# Patient Record
Sex: FEMALE | Employment: UNEMPLOYED | URBAN - METROPOLITAN AREA
[De-identification: names, ages, dates, MRNs, and addresses within clinical notes are randomized per-mention and may not be internally consistent; named-entity substitution may affect disease eponyms.]

---

## 2017-01-27 RX ORDER — TIZANIDINE 4 MG/1
TABLET ORAL
Qty: 270 TABLET | Refills: 0 | Status: SHIPPED | OUTPATIENT
Start: 2017-01-27 | End: 2017-09-01

## 2017-01-27 RX ORDER — ONDANSETRON 4 MG/1
TABLET, ORALLY DISINTEGRATING ORAL
Qty: 30 TABLET | Refills: 0 | Status: SHIPPED | OUTPATIENT
Start: 2017-01-27 | End: 2017-05-25

## 2017-01-27 NOTE — TELEPHONE ENCOUNTER
Medication: Tizanidine & Zofran    Date of last refill: 06/08/16 # 270 & 11/18/16  [place]     Last office visit: 10/18/16  Due back to clinic per last office note:  RTN in 8 months by 06/18/17  Date next office visit sc

## 2017-01-27 NOTE — TELEPHONE ENCOUNTER
Medication: Ketorolac injectible    Date of last refill: 6/7/16  [place]    Last office visit: 10/18/16  Due back to clinic per last office note:  8 months  Date next office visit scheduled:  No future appointments.

## 2017-01-28 RX ORDER — KETOROLAC TROMETHAMINE 30 MG/ML
INJECTION, SOLUTION INTRAMUSCULAR; INTRAVENOUS
Qty: 10 ML | Refills: 3 | Status: SHIPPED | OUTPATIENT
Start: 2017-01-28 | End: 2018-12-20

## 2017-01-30 ENCOUNTER — TELEPHONE (OUTPATIENT)
Dept: NEUROLOGY | Facility: CLINIC | Age: 37
End: 2017-01-30

## 2017-01-30 NOTE — TELEPHONE ENCOUNTER
Received fax from [employer] that ketorolac is again not covered. On 12/12/16, Jarod noted that no PA was required if ordered by [facility] provider. [facility] notes that her insurance information may have changed.      Box:  [id number]  PCN:  [id number]

## 2017-02-09 NOTE — TELEPHONE ENCOUNTER
[employer] called stating ketorolac tromethamine 30 MG/ML Injection Solution  In NOT formulary  And can try a PA, PA started already

## 2017-05-23 RX ORDER — NARATRIPTAN 2.5 MG/1
TABLET ORAL
Qty: 9 TABLET | Refills: 5 | Status: SHIPPED | OUTPATIENT
Start: 2017-05-23 | End: 2018-10-08

## 2017-05-23 NOTE — TELEPHONE ENCOUNTER
Medication: Naratriptan    Date of last refill: 6/7/16  [place]    Last office visit: 10/18/16  Due back to clinic per last office note:  8 months  Date next office visit scheduled:  No future appointments.     Last OV

## 2017-05-25 ENCOUNTER — OFFICE VISIT (OUTPATIENT)
Dept: NEUROLOGY | Facility: CLINIC | Age: 37
End: 2017-05-25

## 2017-05-25 VITALS
HEIGHT: 67 IN | SYSTOLIC BLOOD PRESSURE: 120 MMHG | DIASTOLIC BLOOD PRESSURE: 77 MMHG | BODY MASS INDEX: 33.59 KG/M2 | HEART RATE: 88 BPM | WEIGHT: 214 LBS | RESPIRATION RATE: 16 BRPM

## 2017-05-25 DIAGNOSIS — M34.1 CREST SYNDROME (HCC): ICD-10-CM

## 2017-05-25 DIAGNOSIS — M35.9 CONNECTIVE TISSUE DISEASE (HCC): ICD-10-CM

## 2017-05-25 DIAGNOSIS — G43.009 MIGRAINE WITHOUT AURA AND WITHOUT STATUS MIGRAINOSUS, NOT INTRACTABLE: Primary | ICD-10-CM

## 2017-05-25 PROCEDURE — 99213 OFFICE O/P EST LOW 20 MIN: CPT | Performed by: PHYSICIAN ASSISTANT

## 2017-05-25 RX ORDER — ONDANSETRON 4 MG/1
TABLET, ORALLY DISINTEGRATING ORAL
Qty: 30 TABLET | Refills: 0 | Status: SHIPPED | OUTPATIENT
Start: 2017-05-25 | End: 2017-06-25

## 2017-05-25 RX ORDER — METHYLPREDNISOLONE 4 MG/1
TABLET ORAL
Qty: 1 PACKAGE | Refills: 0 | Status: SHIPPED | OUTPATIENT
Start: 2017-05-25

## 2017-05-25 RX ORDER — HYDROCODONE BITARTRATE AND ACETAMINOPHEN 7.5; 325 MG/1; MG/1
TABLET ORAL
Qty: 30 TABLET | Refills: 0 | Status: SHIPPED | OUTPATIENT
Start: 2017-05-25 | End: 2018-12-20

## 2017-05-25 RX ORDER — METHYLPREDNISOLONE 4 MG/1
4 TABLET ORAL
COMMUNITY
End: 2018-12-20

## 2017-05-25 NOTE — PROGRESS NOTES
HPI:    Patient ID: Tamar Masterson is a 44year old female. HPI     Patient is a 44year old female here for follow-up of migraines. 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  Patient has been living in [place] for the shortness of breath. Cardiovascular: Negative for chest pain. Gastrointestinal: Positive for nausea and vomiting. Musculoskeletal: Positive for myalgias. Negative for gait problem. Skin: Negative for rash. Neurological: Positive for headaches.  [place] lb.    PHYSICAL EXAM:   Physical Exam   Constitutional: She is oriented to person, place, and time. She appears well-developed and well-nourished. HENT:   Head: Normocephalic and atraumatic.    Eyes: Conjunctivae and EOM are normal.   Cardiovascular: Norm ondansetron 4 MG Oral Tablet Dispersible 30 tablet 0      Sig: DISSOLVE 1 TABLET ON THE TONGUE EVERY 8 HOURS AS NEEDED FOR NAUSEA      hydrocodone-acetaminophen 7.5-325 MG Oral Tab 30 tablet 0      Sig: TAKE 1 TABLET BY MOUTH EVERY 6 HOURS AS NEEDED FOR PA

## 2017-05-25 NOTE — PATIENT INSTRUCTIONS
Refill policies:    • Allow 2-3 business days for refills; controlled substances may take longer.   • Contact your pharmacy at least 5 days prior to running out of medication and have them send an electronic request or submit request through the [facility] have a procedure or additional testing performed. [facility]) will contact your insurance carrier to obtain pre-certification or prior authorization.     Unfortunately, [facility] has seen an increase in denial of payment even though the p

## 2017-06-25 DIAGNOSIS — G43.709 CHRONIC MIGRAINE WITHOUT AURA WITHOUT STATUS MIGRAINOSUS, NOT INTRACTABLE: Primary | ICD-10-CM

## 2017-06-26 RX ORDER — ONDANSETRON 4 MG/1
TABLET, ORALLY DISINTEGRATING ORAL
Qty: 30 TABLET | Refills: 0 | Status: SHIPPED | OUTPATIENT
Start: 2017-06-26 | End: 2017-09-01

## 2017-06-26 NOTE — TELEPHONE ENCOUNTER
Medication: Ondatreson 4 mg    Date of last refill: 05/25/17  [place]     Last office visit: 5/25/2017  Due back to clinic per last office note:  RTN in 6 months by 11/25/17  Date next office visit scheduled:  Future Christ

## 2017-09-01 DIAGNOSIS — G43.709 CHRONIC MIGRAINE WITHOUT AURA WITHOUT STATUS MIGRAINOSUS, NOT INTRACTABLE: ICD-10-CM

## 2017-09-01 RX ORDER — TIZANIDINE 4 MG/1
TABLET ORAL
Qty: 270 TABLET | Refills: 0 | Status: SHIPPED | OUTPATIENT
Start: 2017-09-01 | End: 2018-10-08

## 2017-09-01 RX ORDER — ONDANSETRON 4 MG/1
TABLET, ORALLY DISINTEGRATING ORAL
Qty: 30 TABLET | Refills: 0 | Status: SHIPPED | OUTPATIENT
Start: 2017-09-01

## 2017-09-01 NOTE — TELEPHONE ENCOUNTER
Medication: Tizanidine 4 mg     Date of last refill: 01/27/17 # 270  [place]      Last office visit: 5/25/2017  Due back to clinic per last office note:  RTN in 6 months by 11/25/17  Date next office visit scheduled:   Miguel Angel

## 2017-09-01 NOTE — TELEPHONE ENCOUNTER
Medication: Ondatreson 4 mg     Date of last refill: 06/26/17  [place]      Last office visit: 5/25/2017  Due back to clinic per last office note:  RTN in 6 months by 11/25/17  Date next office visit scheduled:  Future A

## 2018-10-08 NOTE — TELEPHONE ENCOUNTER
Medication: Tizanidine 4 mg & Naratriptan 2.5 mg     Date of last refill: 01/27/17 # 270  [place]      Last office visit: 5/25/2017  Due back to clinic per last office note:  RTN in 6 months by 11/25/17  Date next office

## 2018-10-19 RX ORDER — NARATRIPTAN 2.5 MG/1
TABLET ORAL
Qty: 9 TABLET | Refills: 1 | Status: SHIPPED | OUTPATIENT
Start: 2018-10-19 | End: 2019-08-26

## 2018-10-19 RX ORDER — TIZANIDINE 4 MG/1
TABLET ORAL
Qty: 90 TABLET | Refills: 0 | Status: SHIPPED | OUTPATIENT
Start: 2018-10-19 | End: 2018-12-20

## 2018-12-20 ENCOUNTER — OFFICE VISIT (OUTPATIENT)
Dept: NEUROLOGY | Facility: CLINIC | Age: 38
End: 2018-12-20

## 2018-12-20 VITALS
DIASTOLIC BLOOD PRESSURE: 74 MMHG | HEART RATE: 78 BPM | SYSTOLIC BLOOD PRESSURE: 128 MMHG | RESPIRATION RATE: 18 BRPM | WEIGHT: 183 LBS | BODY MASS INDEX: 29 KG/M2

## 2018-12-20 DIAGNOSIS — G43.709 CHRONIC MIGRAINE WITHOUT AURA WITHOUT STATUS MIGRAINOSUS, NOT INTRACTABLE: ICD-10-CM

## 2018-12-20 DIAGNOSIS — M79.10 MYALGIA: ICD-10-CM

## 2018-12-20 PROCEDURE — 99213 OFFICE O/P EST LOW 20 MIN: CPT | Performed by: PHYSICIAN ASSISTANT

## 2018-12-20 RX ORDER — TIZANIDINE 4 MG/1
TABLET ORAL
Qty: 90 TABLET | Refills: 1 | Status: SHIPPED | OUTPATIENT
Start: 2018-12-20 | End: 2019-04-22

## 2018-12-20 RX ORDER — KETOROLAC TROMETHAMINE 30 MG/ML
INJECTION, SOLUTION INTRAMUSCULAR; INTRAVENOUS
Qty: 10 ML | Refills: 1 | Status: SHIPPED | OUTPATIENT
Start: 2018-12-20

## 2018-12-20 RX ORDER — HYDROCODONE BITARTRATE AND ACETAMINOPHEN 7.5; 325 MG/1; MG/1
TABLET ORAL
Qty: 30 TABLET | Refills: 0 | Status: SHIPPED | OUTPATIENT
Start: 2018-12-20

## 2018-12-20 RX ORDER — TOPIRAMATE 50 MG/1
TABLET, FILM COATED ORAL
Qty: 90 TABLET | Refills: 1 | Status: SHIPPED | OUTPATIENT
Start: 2018-12-20

## 2018-12-20 NOTE — PROGRESS NOTES
[facility] with [facility]  4/16/1980  Primary Care Provider:  Thien Purdy    12/20/2018  Accompanied visit:  (X) No () yes, by:    43year old female patient being seen for: Head EVERY NIGHT AT BEDTIME, Disp: 90 tablet, Rfl: 1  •  HYDROcodone-acetaminophen 7.5-325 MG Oral Tab, TAKE 1 TABLET BY MOUTH EVERY 6 HOURS AS NEEDED FOR PAIN, Disp: 30 tablet, Rfl: 0  •  Insulin Syringe-Needle U-100 (BD INSULIN SYRINGE) 25G X 1\" 1 ML Does no status migrainosus, not intractable  Myalgia    Discussion on the case:    After review of her chart it does appear that the topamax was previously effective for her. Will have her start the topamax for headache prevention.  She can continue the aleve and b

## 2018-12-20 NOTE — PATIENT INSTRUCTIONS
Refill policies:    • Allow 2-3 business days for refills; controlled substances may take longer.   • Contact your pharmacy at least 5 days prior to running out of medication and have them send an electronic request or submit request through the “request re entire amount billed. Precertification and Prior Authorizations: If your physician has recommended that you have a procedure or additional testing performed.   [facility]) will contact your insurance carrier to obtain pre-certi

## 2019-04-22 RX ORDER — TIZANIDINE 4 MG/1
TABLET ORAL
Qty: 90 TABLET | Refills: 2 | Status: SHIPPED | OUTPATIENT
Start: 2019-04-22

## 2019-04-22 NOTE — TELEPHONE ENCOUNTER
Medication: Tizanidine 4 mg    Date of last refill: 12/20/18 with 1 addt refill # 90  [place]     Last office visit: 12/20/2018  Due back to clinic per last office note: RTN in 1 year by 12/20/19  Date next office visit

## 2019-08-26 RX ORDER — NARATRIPTAN 2.5 MG/1
TABLET ORAL
Qty: 9 TABLET | Refills: 1 | Status: SHIPPED | OUTPATIENT
Start: 2019-08-26

## 2019-08-26 NOTE — TELEPHONE ENCOUNTER
Medication:Nartariptan2.5 mg     Date of last refill: 12/20/18 with 1 addt refill # 90  [place]      Last office visit: 12/20/2018  Due back to clinic per last office note: RTN in 1 year by 12/20/19  Date next office vis

## 2022-09-01 ENCOUNTER — TELEPHONE (OUTPATIENT)
Dept: NEUROLOGY | Facility: CLINIC | Age: 42
End: 2022-09-01

## (undated) NOTE — MR AVS SNAPSHOT
[address] [facility] [address] [phone]               Thank you for choosing us for your health care visit with LAINA Saenz.   We are glad to serve you and happy to provide you with this summary of your visit ? If your prescription is due for a refill, you may be due for a follow up appointment. ? To best provide you care, patients receiving routine medications need to be seen at least once a year.      protocol for controlled substances:  Written prescr Allergies as of May 25, 2017     Latex                 Today's Vital Signs     BP Pulse Height Weight BMI    120/77 mmHg 88 67\" 214 lb 33.51 kg/m2         Current Medications          This list is accurate as of: 5/25/17 11:26 AM.  Always use your most re TAKE 1 TABLET BY MOUTH EVERY MORNING AND 2 TABLETS BY MOUTH EVERY NIGHT AT BEDTIME   Commonly known as:  Carmell Lev           * Notice: This list has 2 medication(s) that are the same as other medications prescribed for you.  Read the directions carefully, a Eat plenty of protein, keep the fat content low Sugars:  sodas and sports drinks, candies and desserts   Eat plenty of low-fat dairy products High fat meats and dairy   Choose whole grain products Foods high in sodium   Water is best for hydration Fast torsten